# Patient Record
Sex: MALE | URBAN - METROPOLITAN AREA
[De-identification: names, ages, dates, MRNs, and addresses within clinical notes are randomized per-mention and may not be internally consistent; named-entity substitution may affect disease eponyms.]

---

## 2019-03-25 ENCOUNTER — IMPORTED ENCOUNTER (OUTPATIENT)
Dept: URBAN - METROPOLITAN AREA CLINIC 50 | Facility: CLINIC | Age: 74
End: 2019-03-25

## 2019-03-27 ENCOUNTER — IMPORTED ENCOUNTER (OUTPATIENT)
Dept: URBAN - METROPOLITAN AREA CLINIC 50 | Facility: CLINIC | Age: 74
End: 2019-03-27

## 2019-04-23 ENCOUNTER — IMPORTED ENCOUNTER (OUTPATIENT)
Dept: URBAN - METROPOLITAN AREA CLINIC 50 | Facility: CLINIC | Age: 74
End: 2019-04-23

## 2019-04-24 ENCOUNTER — IMPORTED ENCOUNTER (OUTPATIENT)
Dept: URBAN - METROPOLITAN AREA CLINIC 50 | Facility: CLINIC | Age: 74
End: 2019-04-24

## 2019-05-02 ENCOUNTER — IMPORTED ENCOUNTER (OUTPATIENT)
Dept: URBAN - METROPOLITAN AREA CLINIC 50 | Facility: CLINIC | Age: 74
End: 2019-05-02

## 2019-05-08 ENCOUNTER — IMPORTED ENCOUNTER (OUTPATIENT)
Dept: URBAN - METROPOLITAN AREA CLINIC 50 | Facility: CLINIC | Age: 74
End: 2019-05-08

## 2019-05-16 ENCOUNTER — IMPORTED ENCOUNTER (OUTPATIENT)
Dept: URBAN - METROPOLITAN AREA CLINIC 50 | Facility: CLINIC | Age: 74
End: 2019-05-16

## 2019-05-21 ENCOUNTER — IMPORTED ENCOUNTER (OUTPATIENT)
Dept: URBAN - METROPOLITAN AREA CLINIC 50 | Facility: CLINIC | Age: 74
End: 2019-05-21

## 2019-06-18 ENCOUNTER — IMPORTED ENCOUNTER (OUTPATIENT)
Dept: URBAN - METROPOLITAN AREA CLINIC 50 | Facility: CLINIC | Age: 74
End: 2019-06-18

## 2019-09-04 ENCOUNTER — IMPORTED ENCOUNTER (OUTPATIENT)
Dept: URBAN - METROPOLITAN AREA CLINIC 50 | Facility: CLINIC | Age: 74
End: 2019-09-04

## 2019-10-16 ENCOUNTER — IMPORTED ENCOUNTER (OUTPATIENT)
Dept: URBAN - METROPOLITAN AREA CLINIC 50 | Facility: CLINIC | Age: 74
End: 2019-10-16

## 2019-12-17 ENCOUNTER — IMPORTED ENCOUNTER (OUTPATIENT)
Dept: URBAN - METROPOLITAN AREA CLINIC 50 | Facility: CLINIC | Age: 74
End: 2019-12-17

## 2020-08-28 NOTE — PROCEDURE NOTE: CLINICAL
PROCEDURE NOTE: Removal of Corneal FB at Slit Lamp OD. Diagnosis: Corneal Foreign Body. Anesthesia: Topical. The patient, the procedure, and the correct site were identified initially. Prior to treatment, the risks/benefits/alternatives were discussed. The patient wished to proceed with procedure. Corneal foreign body was removed using a FB remover and bur at the slit lamp. Patient tolerated procedure well. There were no complications. Post-op instructions given. The residual rust ring was carefully buffed out of the corneal stroma. Negative Joseluis. Patient tolerated procedure well. Vigamox instilled post procedure. Cyclopleged in office. Bandage lens placed on eye. Brunilda Cutting

## 2021-04-23 ASSESSMENT — VISUAL ACUITY
OS_SC: 20/400 BLURRY
OD_SC: 20/200
OS_PH: 20/30
OS_CC: 20/25-1
OD_OTHER: 20/200. >20/400.
OS_BAT: 20/80
OS_SC: 20/400
OD_PH: 20/30
OS_CC: J1+
OD_CC: 20/40
OD_BAT: 20/200
OD_OTHER: 20/200. >20/400.
OS_CC: J2@ 16 IN
OD_SC: 20/30+-
OD_CC: 20/30
OS_CC: J1@ 16 IN
OD_OTHER: 20/200. >20/400.
OS_OTHER: 20/200. 20/200.
OS_OTHER: 20/80. 20/100.
OS_BAT: 20/200
OD_SC: 20/60
OS_PH: 20/25
OS_SC: 20/30-
OD_OTHER: 20/80. 20/100.
OD_CC: J2@ 16 IN
OD_SC: 20/60
OD_CC: J1+
OD_CC: 20/30
OS_CC: 20/100
OS_BAT: 20/200
OD_SC: 20/25-1
OS_SC: 20/25
OS_OTHER: 20/200. >20/400.
OD_BAT: 20/200
OD_BAT: 20/200
OD_BAT: 20/80
OD_CC: J1@ 16 IN
OS_SC: 20/25+
OS_CC: 20/30+
OD_PH: 20/25-
OD_SC: 20/50+2

## 2021-04-23 ASSESSMENT — TONOMETRY
OS_IOP_MMHG: 13
OS_IOP_MMHG: 15
OS_IOP_MMHG: 14
OD_IOP_MMHG: 13
OD_IOP_MMHG: 14
OS_IOP_MMHG: 12
OD_IOP_MMHG: 19
OD_IOP_MMHG: 15
OD_IOP_MMHG: 12
OS_IOP_MMHG: 15
OD_IOP_MMHG: 15
OS_IOP_MMHG: 12
OD_IOP_MMHG: 15
OD_IOP_MMHG: 13
OS_IOP_MMHG: 15
OS_IOP_MMHG: 13